# Patient Record
Sex: MALE | Race: BLACK OR AFRICAN AMERICAN | NOT HISPANIC OR LATINO | URBAN - METROPOLITAN AREA
[De-identification: names, ages, dates, MRNs, and addresses within clinical notes are randomized per-mention and may not be internally consistent; named-entity substitution may affect disease eponyms.]

---

## 2018-01-16 ENCOUNTER — OFFICE (OUTPATIENT)
Dept: URBAN - METROPOLITAN AREA CLINIC 19 | Facility: CLINIC | Age: 58
End: 2018-01-16

## 2018-01-16 VITALS
HEART RATE: 62 BPM | WEIGHT: 211 LBS | DIASTOLIC BLOOD PRESSURE: 79 MMHG | SYSTOLIC BLOOD PRESSURE: 118 MMHG | HEIGHT: 69 IN

## 2018-01-16 DIAGNOSIS — R94.5 ABNORMAL RESULTS OF LIVER FUNCTION STUDIES: ICD-10-CM

## 2018-01-16 LAB
ACTIN (SMOOTH MUSCLE) ANTIBODY: 12 UNITS (ref 0–19)
ANTINUCLEAR ANTIBODIES, IFA: NEGATIVE
FE+TIBC+FER: FERRITIN, SERUM: 531 NG/ML — HIGH (ref 30–400)
FE+TIBC+FER: IRON BIND.CAP.(TIBC): 283 UG/DL (ref 250–450)
FE+TIBC+FER: IRON SATURATION: 24 % (ref 15–55)
FE+TIBC+FER: IRON, SERUM: 67 UG/DL (ref 38–169)
FE+TIBC+FER: UIBC: 216 UG/DL (ref 111–343)
GGT: 398 IU/L — HIGH (ref 0–65)
HEP A AB, TOTAL: POSITIVE
HEPATIC FUNCTION PANEL (7): ALBUMIN, SERUM: 4.1 G/DL (ref 3.5–5.5)
HEPATIC FUNCTION PANEL (7): ALKALINE PHOSPHATASE, S: 305 IU/L — HIGH (ref 39–117)
HEPATIC FUNCTION PANEL (7): ALT (SGPT): 78 IU/L — HIGH (ref 0–44)
HEPATIC FUNCTION PANEL (7): AST (SGOT): 42 IU/L — HIGH (ref 0–40)
HEPATIC FUNCTION PANEL (7): BILIRUBIN, DIRECT: 0.1 MG/DL (ref 0–0.4)
HEPATIC FUNCTION PANEL (7): BILIRUBIN, TOTAL: 0.4 MG/DL (ref 0–1.2)
HEPATIC FUNCTION PANEL (7): PROTEIN, TOTAL, SERUM: 8.1 G/DL (ref 6–8.5)
HEPATITIS PANEL (4): HBSAG SCREEN: NEGATIVE
HEPATITIS PANEL (4): HEP A AB, IGM: NEGATIVE
HEPATITIS PANEL (4): HEP B CORE AB, IGM: NEGATIVE
HEPATITIS PANEL (4): HEP C VIRUS AB: <0.1 S/CO RATIO
MITOCHONDRIAL (M2) ANTIBODY: 8.6 UNITS (ref 0–20)
PANEL 083935: HIV SCREEN 4TH GENERATION WRFX: NON REACTIVE
PROTHROMBIN TIME (PT): INR: 1 (ref 0.8–1.2)
PROTHROMBIN TIME (PT): PROTHROMBIN TIME: 10.7 SEC (ref 9.1–12)

## 2018-01-16 PROCEDURE — 99243 OFF/OP CNSLTJ NEW/EST LOW 30: CPT | Performed by: INTERNAL MEDICINE

## 2018-01-16 NOTE — SERVICEHPINOTES
The patient presents for evaluation of  abnormal liver function tests   found   on routine exam  . The lab values were as follows:   SGOT =   39  , SGPT =   55  , ALK Phos. =   222  , Gamma GT =   , T. Bilirubin =   0.2  , D. Bilirubin =   , I. Bilirubin =   , Albumin =    .   Additionally there is a positive test for   .    There is pertinent history of   .   There is no history of   transfusions, tattoos, family history of liver disease, alcohol use, IV drug use, diabetes mellitus, obesity and rejection as a bood donor  .Two healthy siblings. Mother without liver disease. Unknown father historyRetired , now a . Was in [employer] and [place] time to [place] [place], and [facility]

## 2018-03-13 ENCOUNTER — OFFICE (OUTPATIENT)
Dept: URBAN - METROPOLITAN AREA CLINIC 19 | Facility: CLINIC | Age: 58
End: 2018-03-13

## 2018-03-13 VITALS
HEIGHT: 69 IN | SYSTOLIC BLOOD PRESSURE: 131 MMHG | DIASTOLIC BLOOD PRESSURE: 88 MMHG | WEIGHT: 213 LBS | HEART RATE: 61 BPM

## 2018-03-13 DIAGNOSIS — R94.5 ABNORMAL RESULTS OF LIVER FUNCTION STUDIES: ICD-10-CM

## 2018-03-13 DIAGNOSIS — R77.8 OTHER SPECIFIED ABNORMALITIES OF PLASMA PROTEINS: ICD-10-CM

## 2018-03-13 DIAGNOSIS — K76.9 LIVER DISEASE, UNSPECIFIED: ICD-10-CM

## 2018-03-13 LAB
COMP. METABOLIC PANEL (14): A/G RATIO: 1.2 (ref 1.2–2.2)
COMP. METABOLIC PANEL (14): ALBUMIN, SERUM: 4 G/DL (ref 3.5–5.5)
COMP. METABOLIC PANEL (14): ALKALINE PHOSPHATASE, S: 344 IU/L — HIGH (ref 39–117)
COMP. METABOLIC PANEL (14): ALT (SGPT): 84 IU/L — HIGH (ref 0–44)
COMP. METABOLIC PANEL (14): AST (SGOT): 46 IU/L — HIGH (ref 0–40)
COMP. METABOLIC PANEL (14): BILIRUBIN, TOTAL: 0.3 MG/DL (ref 0–1.2)
COMP. METABOLIC PANEL (14): BUN/CREATININE RATIO: 10 (ref 9–20)
COMP. METABOLIC PANEL (14): BUN: 11 MG/DL (ref 6–24)
COMP. METABOLIC PANEL (14): CALCIUM, SERUM: 9.8 MG/DL (ref 8.7–10.2)
COMP. METABOLIC PANEL (14): CARBON DIOXIDE, TOTAL: 25 MMOL/L (ref 18–29)
COMP. METABOLIC PANEL (14): CHLORIDE, SERUM: 104 MMOL/L (ref 96–106)
COMP. METABOLIC PANEL (14): CREATININE, SERUM: 1.13 MG/DL (ref 0.76–1.27)
COMP. METABOLIC PANEL (14): EGFR IF AFRICN AM: 84 ML/MIN/1.73 (ref 59–?)
COMP. METABOLIC PANEL (14): EGFR IF NONAFRICN AM: 73 ML/MIN/1.73 (ref 59–?)
COMP. METABOLIC PANEL (14): GLOBULIN, TOTAL: 3.4 G/DL (ref 1.5–4.5)
COMP. METABOLIC PANEL (14): GLUCOSE, SERUM: 94 MG/DL (ref 65–99)
COMP. METABOLIC PANEL (14): POTASSIUM, SERUM: 5.2 MMOL/L (ref 3.5–5.2)
COMP. METABOLIC PANEL (14): PROTEIN, TOTAL, SERUM: 7.4 G/DL (ref 6–8.5)
COMP. METABOLIC PANEL (14): SODIUM, SERUM: 143 MMOL/L (ref 134–144)
FERRITIN, SERUM: 525 NG/ML — HIGH (ref 30–400)
GGT: 556 IU/L — HIGH (ref 0–65)

## 2018-03-13 PROCEDURE — 99213 OFFICE O/P EST LOW 20 MIN: CPT | Performed by: INTERNAL MEDICINE

## 2018-03-13 NOTE — SERVICEHPINOTES
The patient presents for evaluation of abnormal liver function tests found on routine exam. The lab values were as follows: SGOT = 39, SGPT = 55, ALK Phos. = 222, T. Bilirubin = 0.2 There is no history of transfusions, tattoos, family history of liver disease, alcohol use, IV drug use, diabetes mellitus, obesity and rejection as a bood donor. Two healthy siblings. Mother without liver disease. Unknown father history. Retired , now a . 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  Although previously he denied taking any supplements, he now does mention that he was he using a supplement called rhino 7.Recalls [facility]oscopy 5 [facility]. I assume he is in a recall system

## 2018-03-13 NOTE — SERVICENOTES
I told him that he should go ahead and fill out the FDA form online regarding the possible adverse reaction to rhino 7

## 2018-03-22 ENCOUNTER — OFFICE (OUTPATIENT)
Dept: URBAN - METROPOLITAN AREA PATHOLOGY 22 | Facility: PATHOLOGY | Age: 58
End: 2018-03-22
Payer: OTHER GOVERNMENT

## 2018-03-22 DIAGNOSIS — R74.8 ABNORMAL LEVELS OF OTHER SERUM ENZYMES: ICD-10-CM

## 2018-03-22 PROCEDURE — 88325 CONSLTJ COMPRE RVW REC REPRT: CPT

## 2018-05-15 ENCOUNTER — OFFICE (OUTPATIENT)
Dept: URBAN - METROPOLITAN AREA CLINIC 19 | Facility: CLINIC | Age: 58
End: 2018-05-15

## 2018-05-15 VITALS
HEIGHT: 69 IN | WEIGHT: 205 LBS | SYSTOLIC BLOOD PRESSURE: 123 MMHG | DIASTOLIC BLOOD PRESSURE: 84 MMHG | HEART RATE: 69 BPM

## 2018-05-15 DIAGNOSIS — R94.5 ABNORMAL RESULTS OF LIVER FUNCTION STUDIES: ICD-10-CM

## 2018-05-15 DIAGNOSIS — K76.9 LIVER DISEASE, UNSPECIFIED: ICD-10-CM

## 2018-05-15 LAB
AFP, SERUM, TUMOR MARKER: 1.6 NG/ML (ref 0–8.3)
HEPATIC FUNCTION PANEL (7): ALBUMIN: 4.2 G/DL (ref 3.5–5.5)
HEPATIC FUNCTION PANEL (7): ALKALINE PHOSPHATASE: 338 IU/L — HIGH (ref 39–117)
HEPATIC FUNCTION PANEL (7): ALT (SGPT): 84 IU/L — HIGH (ref 0–44)
HEPATIC FUNCTION PANEL (7): AST (SGOT): 76 IU/L — HIGH (ref 0–40)
HEPATIC FUNCTION PANEL (7): BILIRUBIN, DIRECT: 0.1 MG/DL (ref 0–0.4)
HEPATIC FUNCTION PANEL (7): BILIRUBIN, TOTAL: 0.2 MG/DL (ref 0–1.2)
HEPATIC FUNCTION PANEL (7): PROTEIN, TOTAL: 7.8 G/DL (ref 6–8.5)
LIVER-KIDNEY MICROSOMAL AB: 2.2 UNITS (ref 0–20)

## 2018-05-15 PROCEDURE — 99213 OFFICE O/P EST LOW 20 MIN: CPT | Performed by: INTERNAL MEDICINE

## 2018-06-18 ENCOUNTER — OFFICE (OUTPATIENT)
Dept: URBAN - METROPOLITAN AREA CLINIC 19 | Facility: CLINIC | Age: 58
End: 2018-06-18

## 2018-07-31 ENCOUNTER — OFFICE (OUTPATIENT)
Dept: URBAN - METROPOLITAN AREA CLINIC 19 | Facility: CLINIC | Age: 58
End: 2018-07-31

## 2018-12-03 ENCOUNTER — OFFICE (OUTPATIENT)
Dept: URBAN - METROPOLITAN AREA CLINIC 19 | Facility: CLINIC | Age: 58
End: 2018-12-03

## 2019-05-21 ENCOUNTER — OFFICE (OUTPATIENT)
Dept: URBAN - METROPOLITAN AREA CLINIC 19 | Facility: CLINIC | Age: 59
End: 2019-05-21

## 2019-05-21 VITALS
DIASTOLIC BLOOD PRESSURE: 78 MMHG | HEART RATE: 74 BPM | SYSTOLIC BLOOD PRESSURE: 129 MMHG | WEIGHT: 216 LBS | HEIGHT: 69 IN

## 2019-05-21 DIAGNOSIS — S36.119D: ICD-10-CM

## 2019-05-21 LAB
HEPATIC FUNCTION PANEL (7): ALBUMIN: 4.3 G/DL (ref 3.5–5.5)
HEPATIC FUNCTION PANEL (7): ALKALINE PHOSPHATASE: 192 IU/L — HIGH (ref 39–117)
HEPATIC FUNCTION PANEL (7): ALT (SGPT): 31 IU/L (ref 0–44)
HEPATIC FUNCTION PANEL (7): AST (SGOT): 22 IU/L (ref 0–40)
HEPATIC FUNCTION PANEL (7): BILIRUBIN, DIRECT: 0.09 MG/DL (ref 0–0.4)
HEPATIC FUNCTION PANEL (7): BILIRUBIN, TOTAL: 0.3 MG/DL (ref 0–1.2)
HEPATIC FUNCTION PANEL (7): PROTEIN, TOTAL: 7.6 G/DL (ref 6–8.5)
HEPATITIS PANEL (4): HBSAG SCREEN: NEGATIVE
HEPATITIS PANEL (4): HEP A AB, IGM: NEGATIVE
HEPATITIS PANEL (4): HEP B CORE AB, IGM: NEGATIVE
HEPATITIS PANEL (4): HEP C VIRUS AB: <0.1 S/CO RATIO
HERED.HEMOCHROMATOSIS, DNA: HEREDITARY  HEMOCHROMATOSIS: (no result)

## 2019-05-21 PROCEDURE — 99213 OFFICE O/P EST LOW 20 MIN: CPT | Performed by: INTERNAL MEDICINE

## 2019-06-03 ENCOUNTER — OFFICE (OUTPATIENT)
Dept: URBAN - METROPOLITAN AREA CLINIC 19 | Facility: CLINIC | Age: 59
End: 2019-06-03

## 2020-05-04 ENCOUNTER — OFFICE (OUTPATIENT)
Dept: URBAN - METROPOLITAN AREA CLINIC 19 | Facility: CLINIC | Age: 60
End: 2020-05-04

## 2023-08-24 ENCOUNTER — OFFICE (OUTPATIENT)
Dept: URBAN - METROPOLITAN AREA CLINIC 19 | Facility: CLINIC | Age: 63
End: 2023-08-24

## 2023-08-24 VITALS
OXYGEN SATURATION: 98 % | SYSTOLIC BLOOD PRESSURE: 120 MMHG | DIASTOLIC BLOOD PRESSURE: 74 MMHG | HEART RATE: 64 BPM | HEIGHT: 69 IN | WEIGHT: 198 LBS

## 2023-08-24 DIAGNOSIS — R94.5 ABNORMAL RESULTS OF LIVER FUNCTION STUDIES: ICD-10-CM

## 2023-08-24 DIAGNOSIS — K74.3 PRIMARY BILIARY CIRRHOSIS: ICD-10-CM

## 2023-08-24 DIAGNOSIS — R74.8 ABNORMAL LEVELS OF OTHER SERUM ENZYMES: ICD-10-CM

## 2023-08-24 PROCEDURE — 99204 OFFICE O/P NEW MOD 45 MIN: CPT | Performed by: NURSE PRACTITIONER

## 2023-08-24 NOTE — SERVICEHPINOTES
PREVIOUS HISTORY BY DR. KELLEY:alex johnson The patient presents for evaluation of abnormal liver function tests found on routine exam. The lab values were as follows: SGOT = 39, SGPT = 55, ALK Phos. = 222, T. Bilirubin = 0.2 There is no history of transfusions, tattoos, family history of liver disease, alcohol use, IV drug use, diabetes mellitus, obesity and rejection as a bood donor. Two healthy siblings. Mother without liver disease. Unknown father history. Retired , now a . 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  Some loose ends we wanted to tie were his ferritin level in the 400s and iron stain on his biopsy was 2+ hemosiderin.  Also I would like to repeat his hepatitis B and C status as it was done early in his  courseColonoscopy [facility] was negative with recall 2023 advised.
alex johnson  
Please see labs in chart and MRI report, hepatology referral.alex johnson   UPDATE BY MARIA LUISA TORREZ 8/24/23: 
alex johnson
Mr. Rankin presents to clinic today to discuss his liver enzymes. He prefers to be cared for by Dr. Kelley and not hepatology clinic (financial reasons, repetitive, difficulty getting in contact with staff for concerns, etc.).
alex Darnell is doing very well overall.  His last liver biopsy was in March of 2023 which was nondiagnostic.  Hepatology Clinic was treating him for possible PBC with a negative AMA.   He tried ursodiol for 4 days with food -twice daily taking 2 capsules in the morning and 2 capsules in the evening.  He had mid lower abdominal pain that was severe for 2 or 3 days.  He stopped this medication and called the hepatology Clinic.  He never received a call back.
alex Darnell does not have itching, yellow skin, dark urine, melena, hematochezia, constipation, diarrhea, bloating, weight loss, anorexia, nausea / vomiting, heartburn.
alex johnsonNo herbals or supplements.  The only supplementation he takes his vitamin D. No new medications.  He had an MRI abdomen in March of 2023.  He had some lab work in May of 2023 with a total bilirubin of 0.3, alkaline phosphatase 200, AST 25, ALT 32.
alex Darnell is scheduled with Dr. Kelley for recall colonoscopy next month.alex

## 2023-08-30 LAB
AFP, SERUM, TUMOR MARKER: 1.8 NG/ML (ref 0–8.4)
GGT: 269 IU/L — HIGH (ref 0–65)
HEPATIC FUNCTION PANEL (7): ALBUMIN: 4.3 G/DL (ref 3.8–4.9)
HEPATIC FUNCTION PANEL (7): ALKALINE PHOSPHATASE: 225 IU/L — HIGH (ref 44–121)
HEPATIC FUNCTION PANEL (7): ALT (SGPT): 39 IU/L (ref 0–44)
HEPATIC FUNCTION PANEL (7): AST (SGOT): 27 IU/L (ref 0–40)
HEPATIC FUNCTION PANEL (7): BILIRUBIN, DIRECT: 0.1 MG/DL (ref 0–0.4)
HEPATIC FUNCTION PANEL (7): BILIRUBIN, TOTAL: 0.2 MG/DL (ref 0–1.2)
HEPATIC FUNCTION PANEL (7): PROTEIN, TOTAL: 7.5 G/DL (ref 6–8.5)
PROTHROMBIN TIME (PT): INR: 1 (ref 0.9–1.2)
PROTHROMBIN TIME (PT): PROTHROMBIN TIME: 10.9 SEC (ref 9.1–12)
TSH: 1.36 UIU/ML (ref 0.45–4.5)
VITAMIN A, SERUM: VITAMIN A: 37.3 UG/DL (ref 22–69.5)

## 2023-09-27 ENCOUNTER — AMBULATORY SURGICAL CENTER (OUTPATIENT)
Dept: URBAN - METROPOLITAN AREA SURGERY 2 | Facility: SURGERY | Age: 63
End: 2023-09-27
Payer: OTHER GOVERNMENT

## 2023-09-27 VITALS
HEART RATE: 53 BPM | OXYGEN SATURATION: 100 % | SYSTOLIC BLOOD PRESSURE: 137 MMHG | RESPIRATION RATE: 17 BRPM | TEMPERATURE: 98.1 F | OXYGEN SATURATION: 99 % | SYSTOLIC BLOOD PRESSURE: 96 MMHG | DIASTOLIC BLOOD PRESSURE: 57 MMHG | TEMPERATURE: 97.6 F | HEART RATE: 58 BPM | SYSTOLIC BLOOD PRESSURE: 118 MMHG | TEMPERATURE: 97.6 F | SYSTOLIC BLOOD PRESSURE: 98 MMHG | RESPIRATION RATE: 20 BRPM | DIASTOLIC BLOOD PRESSURE: 56 MMHG | DIASTOLIC BLOOD PRESSURE: 51 MMHG | SYSTOLIC BLOOD PRESSURE: 99 MMHG | HEIGHT: 69 IN | SYSTOLIC BLOOD PRESSURE: 96 MMHG | HEART RATE: 60 BPM | OXYGEN SATURATION: 98 % | SYSTOLIC BLOOD PRESSURE: 98 MMHG | OXYGEN SATURATION: 100 % | SYSTOLIC BLOOD PRESSURE: 118 MMHG | SYSTOLIC BLOOD PRESSURE: 98 MMHG | SYSTOLIC BLOOD PRESSURE: 137 MMHG | RESPIRATION RATE: 18 BRPM | DIASTOLIC BLOOD PRESSURE: 62 MMHG | WEIGHT: 197 LBS | OXYGEN SATURATION: 98 % | HEART RATE: 53 BPM | RESPIRATION RATE: 18 BRPM | DIASTOLIC BLOOD PRESSURE: 90 MMHG | HEART RATE: 52 BPM | TEMPERATURE: 98.1 F | HEART RATE: 58 BPM | OXYGEN SATURATION: 100 % | RESPIRATION RATE: 20 BRPM | SYSTOLIC BLOOD PRESSURE: 137 MMHG | DIASTOLIC BLOOD PRESSURE: 90 MMHG | DIASTOLIC BLOOD PRESSURE: 57 MMHG | HEART RATE: 52 BPM | DIASTOLIC BLOOD PRESSURE: 62 MMHG | SYSTOLIC BLOOD PRESSURE: 118 MMHG | HEART RATE: 60 BPM | SYSTOLIC BLOOD PRESSURE: 99 MMHG | HEART RATE: 52 BPM | WEIGHT: 197 LBS | HEIGHT: 69 IN | OXYGEN SATURATION: 99 % | HEART RATE: 60 BPM | OXYGEN SATURATION: 99 % | DIASTOLIC BLOOD PRESSURE: 51 MMHG | DIASTOLIC BLOOD PRESSURE: 56 MMHG | RESPIRATION RATE: 20 BRPM | HEART RATE: 58 BPM | DIASTOLIC BLOOD PRESSURE: 51 MMHG | WEIGHT: 197 LBS | HEART RATE: 53 BPM | DIASTOLIC BLOOD PRESSURE: 90 MMHG | RESPIRATION RATE: 17 BRPM | TEMPERATURE: 97.6 F | OXYGEN SATURATION: 98 % | RESPIRATION RATE: 18 BRPM | TEMPERATURE: 98.1 F | DIASTOLIC BLOOD PRESSURE: 62 MMHG | SYSTOLIC BLOOD PRESSURE: 96 MMHG | DIASTOLIC BLOOD PRESSURE: 57 MMHG | DIASTOLIC BLOOD PRESSURE: 56 MMHG | HEIGHT: 69 IN | SYSTOLIC BLOOD PRESSURE: 99 MMHG | RESPIRATION RATE: 17 BRPM

## 2023-09-27 DIAGNOSIS — Z12.11 ENCOUNTER FOR SCREENING FOR MALIGNANT NEOPLASM OF COLON: ICD-10-CM

## 2023-09-27 PROCEDURE — G0121 COLON CA SCRN NOT HI RSK IND: HCPCS | Performed by: INTERNAL MEDICINE
